# Patient Record
Sex: MALE | Race: BLACK OR AFRICAN AMERICAN | NOT HISPANIC OR LATINO | Employment: OTHER | URBAN - METROPOLITAN AREA
[De-identification: names, ages, dates, MRNs, and addresses within clinical notes are randomized per-mention and may not be internally consistent; named-entity substitution may affect disease eponyms.]

---

## 2017-01-06 ENCOUNTER — OFFICE VISIT (OUTPATIENT)
Dept: OPHTHALMOLOGY | Facility: CLINIC | Age: 77
End: 2017-01-06
Payer: MEDICARE

## 2017-01-06 DIAGNOSIS — H25.12 NUCLEAR SCLEROSIS, LEFT: ICD-10-CM

## 2017-01-06 DIAGNOSIS — H02.401 PTOSIS, RIGHT: Primary | ICD-10-CM

## 2017-01-06 DIAGNOSIS — Z98.890 POST-OPERATIVE STATE: ICD-10-CM

## 2017-01-06 DIAGNOSIS — Z96.1 PSEUDOPHAKIA: ICD-10-CM

## 2017-01-06 PROCEDURE — 99999 PR PBB SHADOW E&M-EST. PATIENT-LVL II: CPT | Mod: PBBFAC,,, | Performed by: OPHTHALMOLOGY

## 2017-01-06 PROCEDURE — 99212 OFFICE O/P EST SF 10 MIN: CPT | Mod: PBBFAC,PO | Performed by: OPHTHALMOLOGY

## 2017-01-06 PROCEDURE — 99024 POSTOP FOLLOW-UP VISIT: CPT | Mod: ,,, | Performed by: OPHTHALMOLOGY

## 2017-01-06 NOTE — PROGRESS NOTES
SUBJECTIVE:   Yovani Fonseca is a 76 y.o. male   Uncorrected distance visual acuity was 20/20 in the right eye and 20/80 in the left eye.   Chief Complaint   Patient presents with    Post-op Evaluation     OD lid drooping        HPI:  HPI     Post-op Evaluation    Additional comments: OD lid drooping           Comments   Pt states since his surgery on the right eye, he's noticed his lid has   been droopy and sunken in superiorly around the orbit. No pain or   irritation. Vision is great, finished his drops on the 4th.     REFERRED BY [facility]  Dm  PCIOL OD 12/1/16  PCIOL OD +23.0 SN60WF / CDE:15.84 12-1-16  No bdr  Dry eyes  H/o punctal plugs 2010 and replaced 11/6/15  Smd  Cats os       Last edited by Leno Morel on 1/6/2017  9:16 AM. (History)        Assessment /Plan :  1. Ptosis, right   Levator dehiscence OD, explained to pt this is commonly seen after cataract surgery and repairable with eyelid surgery after eye completely heals. I recommend he complete his CE OS first and then if OS ptotic, too they can be repaired at the same time. Pt is happy with this explanation and agrees with this approach if Dr Jeronimo agrees, too.   2. Pseudophakia    3. Post-operative state    4. Nuclear sclerosis, left

## 2017-01-06 NOTE — MR AVS SNAPSHOT
[facility] - Ophthalmology  [address] [facility] [place] [address]  Phone: [phone]  Fax: [phone]                  Yovani Fonseca   2017 9:00 AM   Office Visit    Description:  Male : 1940   Provider:  Dawit Duke MD   Department:  [facility] - Ophthalmology           Reason for Visit     Post-op Evaluation           Diagnoses this Visit        Comments    Ptosis, right    -  Primary     Pseudophakia         Post-operative state         Nuclear sclerosis, left                To Do List           Future Appointments        Provider Department Dept Phone    2017 1:00 PM Roberto Carlos Jeronimo MD [facility] Ophthalmology [phone]      Goals (5 Years of Data)     None      [facility] On Call     [facility] On Call Nurse [facility] Line -  Assistance  Registered nurses in the [facility] On Call Center provide clinical advisement, health education, appointment booking, and other advisory services.  Call for this free service at [phone].             Medications           Message regarding Medications     Verify the changes and/or additions to your medication regime listed below are the same as discussed with your clinician today.  If any of these changes or additions are incorrect, please notify your healthcare provider.             Verify that the below list of medications is an accurate representation of the medications you are currently taking.  If none reported, the list may be blank. If incorrect, please contact your healthcare provider. Carry this list with you in case of emergency.           Current Medications     GOSERELIN ACETATE (ZOLADEX SUBQ) Inject 1 Dose into the skin every 3 (three) months.    ramipril (ALTACE) 2.5 MG capsule     rosuvastatin (CRESTOR) 10 MG tablet     sitagliptan (JANUVIA) 25 MG Tab Take 100 mg by mouth once daily.    tamsulosin (FLOMAX) 0.4 mg Cp24     ZETIA 10 mg tablet            Clinical Reference Information           Allergies as of 2017     No Known  Allergies      Immunizations Administered on Date of Encounter - 1/6/2017     None

## 2017-01-27 ENCOUNTER — OFFICE VISIT (OUTPATIENT)
Dept: OPHTHALMOLOGY | Facility: CLINIC | Age: 77
End: 2017-01-27
Payer: MEDICARE

## 2017-01-27 DIAGNOSIS — Z98.890 POST-OPERATIVE STATE: Primary | ICD-10-CM

## 2017-01-27 PROCEDURE — 99999 PR PBB SHADOW E&M-EST. PATIENT-LVL II: CPT | Mod: PBBFAC,,, | Performed by: OPHTHALMOLOGY

## 2017-01-27 PROCEDURE — 99024 POSTOP FOLLOW-UP VISIT: CPT | Mod: ,,, | Performed by: OPHTHALMOLOGY

## 2017-01-27 PROCEDURE — 99212 OFFICE O/P EST SF 10 MIN: CPT | Mod: PBBFAC,PO | Performed by: OPHTHALMOLOGY

## 2017-01-27 RX ORDER — DIFLUPREDNATE OPHTHALMIC 0.5 MG/ML
1 EMULSION OPHTHALMIC 4 TIMES DAILY
COMMUNITY
End: 2017-02-21 | Stop reason: SDUPTHER

## 2017-01-27 RX ORDER — POLYMYXIN B SULFATE AND TRIMETHOPRIM 1; 10000 MG/ML; [USP'U]/ML
1 SOLUTION OPHTHALMIC EVERY 6 HOURS
COMMUNITY
End: 2017-03-03 | Stop reason: ALTCHOICE

## 2017-01-27 NOTE — MR AVS SNAPSHOT
[facility] - Ophthalmology  [address] [place] [address]  Phone: [phone]  Fax: [phone]                  Yovani Fonseca   2017 4:00 PM   Office Visit    Description:  Male : 1940   Provider:  Roberto Carlos Jeronimo MD   Department:  [facility] - Ophthalmology           Reason for Visit     Post-op Evaluation           Diagnoses this Visit        Comments    Post-operative state    -  Primary            To Do List           Future Appointments        Provider Department Dept Phone    2017 4:00 PM Roberto Carlos Jeronimo MD [facility] Ophthalmology [phone]      Goals (5 Years of Data)     None      Follow-Up and Disposition     Return in about 1 week (around 2/3/2017).      [facility] On Call     [facility] On Call Nurse Care Line -  Assistance  Registered nurses in the [facility] On Call Center provide clinical advisement, health education, appointment booking, and other advisory services.  Call for this free service at [phone].             Medications           Message regarding Medications     Verify the changes and/or additions to your medication regime listed below are the same as discussed with your clinician today.  If any of these changes or additions are incorrect, please notify your healthcare provider.             Verify that the below list of medications is an accurate representation of the medications you are currently taking.  If none reported, the list may be blank. If incorrect, please contact your healthcare provider. Carry this list with you in case of emergency.           Current Medications     difluprednate (DUREZOL) 0.05 % Drop ophthalmic solution 1 drop 4 (four) times daily.    GOSERELIN ACETATE (ZOLADEX SUBQ) Inject 1 Dose into the skin every 3 (three) months.    nepafenac (ILEVRO) 0.3 % DrpS Apply to eye.    polymyxin B sulf-trimethoprim (POLYTRIM) 10,000 unit- 1 mg/mL Drop 1 drop every 6 (six) hours.    ramipril (ALTACE) 2.5 MG capsule     rosuvastatin (CRESTOR)  10 MG tablet     sitagliptan (JANUVIA) 25 MG Tab Take 100 mg by mouth once daily.    tamsulosin (FLOMAX) 0.4 mg Cp24     ZETIA 10 mg tablet            Clinical Reference Information           Allergies as of 1/27/2017     No Known Allergies      Immunizations Administered on Date of Encounter - 1/27/2017     None

## 2017-01-27 NOTE — PROGRESS NOTES
HPI     Post-op Evaluation    Additional comments: OS: Durezol QID, Polytrim QID, Ilevro QD           Comments   Pt states that his left eye is a little sore today. States vision is a lot   better. 100% compliant and knowledgeable on gtts regiment.     REFERRED BY [facility]  Dm  PCIOL OS 1/26/17  PCIOL OD +23.0 SN60WF / CDE:15.84 12-1-16  No bdr  Dry eyes  H/o punctal plugs 2010 and replaced 11/6/15  Smd  Cats os      OS: Durezol QID, Polytrim QID, Ilevro QD       Last edited by Zeferino Lieberman, Patient Care Assistant on 1/27/2017  1:57   PM. (History)            Assessment /Plan     For exam results, see Encounter Report.      ICD-10-CM ICD-9-CM    1. Post-operative state Z98.890 V45.89 PO Day 1 S/P Phaco/IOL  left eye  Doing well.    Use Durezol QID  Ilevro daily  Polymyxin B 4 x day  Reinstructed in importance of absolute compliance with Post-OP instructions including medications, shield at bedtime, and limitation of activities. Follow up appointments in approximately one and six weeks or call immediately for increased pain, redness or vision loss.

## 2017-02-03 ENCOUNTER — OFFICE VISIT (OUTPATIENT)
Dept: OPHTHALMOLOGY | Facility: CLINIC | Age: 77
End: 2017-02-03
Payer: MEDICARE

## 2017-02-03 DIAGNOSIS — Z98.890 POST-OPERATIVE STATE: Primary | ICD-10-CM

## 2017-02-03 DIAGNOSIS — Z98.41 CATARACT EXTRACTION STATUS, RIGHT: ICD-10-CM

## 2017-02-03 DIAGNOSIS — Z98.42 CATARACT EXTRACTION STATUS, LEFT: ICD-10-CM

## 2017-02-03 PROCEDURE — 99212 OFFICE O/P EST SF 10 MIN: CPT | Mod: PBBFAC,PO | Performed by: OPHTHALMOLOGY

## 2017-02-03 PROCEDURE — 99024 POSTOP FOLLOW-UP VISIT: CPT | Mod: ,,, | Performed by: OPHTHALMOLOGY

## 2017-02-03 PROCEDURE — 99999 PR PBB SHADOW E&M-EST. PATIENT-LVL II: CPT | Mod: PBBFAC,,, | Performed by: OPHTHALMOLOGY

## 2017-02-03 NOTE — PROGRESS NOTES
HPI     Post-op Evaluation    Additional comments: OS: Durezol QID, Polytrim QID, Ilevro QD           Comments   Pt states that his VA is stable. No pain or discomfort. Both eyes have   been a bit runny lately. 100% compliant with gtts.     REFERRED BY [facility]  Dm  PCIOL OS +23.5 SN60WF /CDE 4.95 1/26/17  PCIOL OD +23.0 SN60WF / CDE:15.84 12-1-16  No bdr  Dry eyes  H/o punctal plugs 2010 and replaced 11/6/15  Smd  Cats os      OS: Durezol QID, Polytrim QID, Ilevro QD       Last edited by Zeferino Lieberman, Patient Care Assistant on 2/3/2017  2:00   PM. (History)            Assessment /Plan     For exam results, see Encounter Report.      ICD-10-CM ICD-9-CM    1. Post-operative state Z98.890 V45.89    2. Cataract extraction status, left Z98.42 V45.61    3. Cataract extraction status, right Z98.41 V45.61               PO Week 1 S/P Phaco/ IOL left eye, 5 week right eye:   Doing well with no evidence of infection or abnormal inflammation.     D/C antibiotic drops  Taper Durezol weekly per instruction sheet.  Ilevro daily 7 more days.  Resume normal activitites and d/c eye shield.  OTC reading glasses can be used until evaluated for final MR.  D/c Shield at night    RTC 5 weeks or PRN pain, redness, vision loss, or other concerns.

## 2017-02-03 NOTE — MR AVS SNAPSHOT
[facility]  [address] [place] [address]  Phone: [phone]  Fax: [phone]                  Yovani Fonseca   2/3/2017 1:30 PM   Office Visit    Description:  Male : 1940   Provider:  Roberto Carlos Jeronimo MD   Department:  [facility]           Reason for Visit     Post-op Evaluation           Diagnoses this Visit        Comments    Post-operative state    -  Primary     Cataract extraction status, left         Cataract extraction status, right                To Do List           Future Appointments        Provider Department Dept Phone    3/3/2017 2:00 PM Roberto Carlos Jeronimo MD [facility] Ophthalmology [phone]      Goals (5 Years of Data)     None      Follow-Up and Disposition     Return in about 5 weeks (around 3/10/2017).      [facility] On Call     [facility] On Call Nurse Care Line -  Assistance  Registered nurses in the [facility] On Call Center provide clinical advisement, health education, appointment booking, and other advisory services.  Call for this free service at [phone].             Medications           Message regarding Medications     Verify the changes and/or additions to your medication regime listed below are the same as discussed with your clinician today.  If any of these changes or additions are incorrect, please notify your healthcare provider.             Verify that the below list of medications is an accurate representation of the medications you are currently taking.  If none reported, the list may be blank. If incorrect, please contact your healthcare provider. Carry this list with you in case of emergency.           Current Medications     difluprednate (DUREZOL) 0.05 % Drop ophthalmic solution 1 drop 4 (four) times daily.    GOSERELIN ACETATE (ZOLADEX SUBQ) Inject 1 Dose into the skin every 3 (three) months.    nepafenac (ILEVRO) 0.3 % DrpS Apply to eye.    polymyxin B sulf-trimethoprim (POLYTRIM) 10,000 unit- 1 mg/mL Drop 1 drop  every 6 (six) hours.    ramipril (ALTACE) 2.5 MG capsule     rosuvastatin (CRESTOR) 10 MG tablet     sitagliptan (JANUVIA) 25 MG Tab Take 100 mg by mouth once daily.    tamsulosin (FLOMAX) 0.4 mg Cp24     ZETIA 10 mg tablet            Clinical Reference Information           Allergies as of 2/3/2017     No Known Allergies      Immunizations Administered on Date of Encounter - 2/3/2017     None      Language Assistance Services     ATTENTION: Language assistance services are available, free of charge. Please call [phone].      ATENCIÓN: Si berna howe, tiene a barrera disposición servicios gratuitos de asistencia lingüística. Llame al [phone].     CHÚ Ý: N?u b?n nói Ti?ng Vi?t, có các d?ch v? h? tr? ngôn ng? mi?n phí dành cho b?n. G?i s? [phone].         [facility] - Ophthal[facility] complies with applicable Federal civil rights laws and does not discriminate on the basis of race, color, national origin, age, disability, or sex.

## 2017-02-21 RX ORDER — DIFLUPREDNATE OPHTHALMIC 0.5 MG/ML
1 EMULSION OPHTHALMIC 4 TIMES DAILY
Qty: 5 ML | Refills: 1 | Status: SHIPPED | OUTPATIENT
Start: 2017-02-21 | End: 2018-09-14

## 2017-02-21 NOTE — TELEPHONE ENCOUNTER
----- Message from Rhonda Alejandre sent at 2/21/2017 11:10 AM CST -----  Contact: pt  Need refill on DUREZOL) pls call into pharmacy at  [phone]

## 2017-03-03 ENCOUNTER — OFFICE VISIT (OUTPATIENT)
Dept: OPHTHALMOLOGY | Facility: CLINIC | Age: 77
End: 2017-03-03
Payer: MEDICARE

## 2017-03-03 DIAGNOSIS — Z98.890 POST-OPERATIVE STATE: Primary | ICD-10-CM

## 2017-03-03 DIAGNOSIS — Z98.42 CATARACT EXTRACTION STATUS, LEFT: ICD-10-CM

## 2017-03-03 DIAGNOSIS — Z98.41 CATARACT EXTRACTION STATUS, RIGHT: ICD-10-CM

## 2017-03-03 PROCEDURE — 99999 PR PBB SHADOW E&M-EST. PATIENT-LVL II: CPT | Mod: PBBFAC,,, | Performed by: OPHTHALMOLOGY

## 2017-03-03 PROCEDURE — 99212 OFFICE O/P EST SF 10 MIN: CPT | Mod: PBBFAC,PO | Performed by: OPHTHALMOLOGY

## 2017-03-03 PROCEDURE — 99024 POSTOP FOLLOW-UP VISIT: CPT | Mod: ,,, | Performed by: OPHTHALMOLOGY

## 2017-03-03 NOTE — PROGRESS NOTES
HPI     Post-op Evaluation    Additional comments: OS: Durezol, Ilvero QD           Comments   5 wk PO PCIOL OS  VA improving  No pain or discomfort    REFERRED BY [facility]  Dm  PCIOL OS +23.5 SN60WF /CDE 4.95 1/26/17  PCIOL OD +23.0 SN60WF / CDE:15.84 12-1-16  No bdr  Dry eyes  H/o punctal plugs 2010 and replaced 11/6/15  Smd    OS: Durezol, Ilevro QD       Last edited by Alona Moreno on 3/3/2017  2:22 PM. (History)            Assessment /Plan     For exam results, see Encounter Report.      ICD-10-CM ICD-9-CM    1. Post-operative state Z98.890 V45.89    2. Cataract extraction status, right Z98.41 V45.61    3. Cataract extraction status, left Z98.42 V45.61        PO Month 1 S/P Phaco/IOL both eye:   Doing Well.    Discontinue medications as per taper sheet.  Dispense PAL Rx  RTC 6 months with Dr. Jung

## 2017-09-12 ENCOUNTER — OFFICE VISIT (OUTPATIENT)
Dept: OPHTHALMOLOGY | Facility: CLINIC | Age: 77
End: 2017-09-12
Payer: MEDICARE

## 2017-09-12 DIAGNOSIS — E11.9 TYPE 2 DIABETES MELLITUS WITHOUT COMPLICATION, WITHOUT LONG-TERM CURRENT USE OF INSULIN: Primary | ICD-10-CM

## 2017-09-12 DIAGNOSIS — H35.3131 EARLY STAGE DRY AGE-RELATED MACULAR DEGENERATION OF BOTH EYES: ICD-10-CM

## 2017-09-12 PROCEDURE — 99212 OFFICE O/P EST SF 10 MIN: CPT | Mod: PBBFAC,PO | Performed by: OPHTHALMOLOGY

## 2017-09-12 PROCEDURE — 92014 COMPRE OPH EXAM EST PT 1/>: CPT | Mod: S$PBB,,, | Performed by: OPHTHALMOLOGY

## 2017-09-12 PROCEDURE — 99999 PR PBB SHADOW E&M-EST. PATIENT-LVL II: CPT | Mod: PBBFAC,,, | Performed by: OPHTHALMOLOGY

## 2017-09-12 NOTE — PROGRESS NOTES
===============================  09/12/2017   Yovani Fonseca,   77 y.o. male   Last visit [facility]: :11/8/2016   Last visit eye dept. Visit date not found  VA:  Uncorrected distance visual acuity was 20/20 in the right eye and 20/25 in the left eye.  Tonometry     Tonometry (Applanation, 1:49 PM)       Right Left    Pressure 14 14               Not recorded         Not recorded        Chief Complaint   Patient presents with    Diabetic Eye Exam     yearly diabetic/amd exam    Macular Degeneration     Ophthalmic Medications     Ophthalmic - Anti-inflammatory, Glucocorticoids Start End    difluprednate (DUREZOL) 0.05 % Drop ophthalmic solution 2/21/2017     Sig: Place 1 drop into the left eye 4 (four) times daily.    Route: Left Eye         HPI     Diabetic Eye Exam    Additional comments: yearly diabetic/amd exam           Comments   REFERRED BY [facility]  Dm  PCIOL OS +23.5 SN60WF /CDE 4.95 1/26/17  PCIOL OD +23.0 SN60WF / CDE:15.84 12-1-16  No bdr  Dry eyes  H/o punctal plugs 2010 and replaced 11/6/15  Smd               Last edited by Mayra Dash on 9/12/2017  1:46 PM. 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       .  Doing great !   codi fung   sl brow traction  rtc 1 year         ===========================

## 2018-09-14 ENCOUNTER — OFFICE VISIT (OUTPATIENT)
Dept: OPHTHALMOLOGY | Facility: CLINIC | Age: 78
End: 2018-09-14
Payer: MEDICARE

## 2018-09-14 DIAGNOSIS — H35.3131 EARLY DRY STAGE NONEXUDATIVE AGE-RELATED MACULAR DEGENERATION OF BOTH EYES: ICD-10-CM

## 2018-09-14 DIAGNOSIS — E11.9 TYPE 2 DIABETES MELLITUS WITHOUT COMPLICATION, WITHOUT LONG-TERM CURRENT USE OF INSULIN: Primary | ICD-10-CM

## 2018-09-14 PROCEDURE — 92134 CPTRZ OPH DX IMG PST SGM RTA: CPT | Mod: PBBFAC,PO | Performed by: OPHTHALMOLOGY

## 2018-09-14 PROCEDURE — 99999 PR PBB SHADOW E&M-EST. PATIENT-LVL II: CPT | Mod: PBBFAC,,, | Performed by: OPHTHALMOLOGY

## 2018-09-14 PROCEDURE — 92014 COMPRE OPH EXAM EST PT 1/>: CPT | Mod: S$PBB,,, | Performed by: OPHTHALMOLOGY

## 2018-09-14 PROCEDURE — 99212 OFFICE O/P EST SF 10 MIN: CPT | Mod: PBBFAC,PO | Performed by: OPHTHALMOLOGY

## 2018-09-14 RX ORDER — FINASTERIDE 5 MG/1
5 TABLET, FILM COATED ORAL
COMMUNITY
Start: 2018-08-21

## 2018-09-14 RX ORDER — ROSUVASTATIN CALCIUM 20 MG/1
TABLET, COATED ORAL
COMMUNITY
Start: 2018-09-12

## 2018-09-14 RX ORDER — FESOTERODINE FUMARATE 8 MG/1
TABLET, FILM COATED, EXTENDED RELEASE ORAL
COMMUNITY
Start: 2018-09-06 | End: 2019-09-16

## 2018-09-14 NOTE — PROGRESS NOTES
"    ===============================  09/14/2018   Yovani Fonseca,   78 y.o. male   Last visit [facility]: :9/12/2017   Last visit eye dept. Visit date not found  VA:  Uncorrected distance visual acuity was 20/20 in the right eye and 20/25 in the left eye.  Tonometry     Tonometry (Applanation, 10:00 AM)       Right Left    Pressure 12 11               Not recorded         Not recorded        Chief Complaint   Patient presents with    Diabetes     yearly exam    Macular Degeneration     Ophthalmic Medications     Ophthalmic - Anti-inflammatory, Glucocorticoids Start End    difluprednate (DUREZOL) 0.05 % Drop ophthalmic solution (Discontinued) 2/21/2017 9/14/2018    Sig: Place 1 drop into the left eye 4 (four) times daily.    Route: Left Eye         HPI     Diabetes      Additional comments: yearly exam              Comments     REFERRED BY [facility]  Dm  PCIOL OS +23.5 SN60WF /CDE 4.95 1/26/17  PCIOL OD +23.0 SN60WF / CDE:15.84 12-1-16  No bdr  Dry eyes  H/o punctal plugs 2010 and replaced 11/6/15  Smd                  Last edited by Mayra Dash on 9/14/2018  9:58 AM. 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

## 2019-05-31 ENCOUNTER — OFFICE VISIT (OUTPATIENT)
Dept: OPHTHALMOLOGY | Facility: CLINIC | Age: 79
End: 2019-05-31
Payer: MEDICARE

## 2019-05-31 DIAGNOSIS — H35.3131 EARLY DRY STAGE NONEXUDATIVE AGE-RELATED MACULAR DEGENERATION OF BOTH EYES: ICD-10-CM

## 2019-05-31 DIAGNOSIS — E11.9 TYPE 2 DIABETES MELLITUS WITHOUT COMPLICATION, WITHOUT LONG-TERM CURRENT USE OF INSULIN: Primary | ICD-10-CM

## 2019-05-31 PROCEDURE — 99999 PR PBB SHADOW E&M-EST. PATIENT-LVL II: CPT | Mod: PBBFAC,,, | Performed by: OPHTHALMOLOGY

## 2019-05-31 PROCEDURE — 99999 PR PBB SHADOW E&M-EST. PATIENT-LVL II: ICD-10-PCS | Mod: PBBFAC,,, | Performed by: OPHTHALMOLOGY

## 2019-05-31 PROCEDURE — 92134 POSTERIOR SEGMENT OCT RETINA (OCULAR COHERENCE TOMOGRAPHY)-BOTH EYES: ICD-10-PCS | Mod: 26,S$PBB,, | Performed by: OPHTHALMOLOGY

## 2019-05-31 PROCEDURE — 92134 CPTRZ OPH DX IMG PST SGM RTA: CPT | Mod: PBBFAC,PN | Performed by: OPHTHALMOLOGY

## 2019-05-31 PROCEDURE — 99212 OFFICE O/P EST SF 10 MIN: CPT | Mod: PBBFAC,PN,25 | Performed by: OPHTHALMOLOGY

## 2019-05-31 PROCEDURE — 92012 INTRM OPH EXAM EST PATIENT: CPT | Mod: S$PBB,,, | Performed by: OPHTHALMOLOGY

## 2019-05-31 PROCEDURE — 92012 PR EYE EXAM, EST PATIENT,INTERMED: ICD-10-PCS | Mod: S$PBB,,, | Performed by: OPHTHALMOLOGY

## 2019-05-31 RX ORDER — OXYBUTYNIN CHLORIDE 5 MG/1
5 TABLET, EXTENDED RELEASE ORAL
COMMUNITY
Start: 2019-01-30 | End: 2019-09-16

## 2019-05-31 NOTE — PROGRESS NOTES
l    ===============================  05/31/2019   Yovani Fonseca,   79 y.o. male   Last visit [facility]: :9/14/2018   Last visit eye dept. Visit date not found  VA:  Uncorrected distance visual acuity was 20/20 in the right eye and 20/20 -2 in the left eye.  Tonometry     Tonometry (Applanation, 11:09 AM)       Right Left    Pressure 12 12               Not recorded         Not recorded        Chief Complaint   Patient presents with    Blurred Vision     Blurry vision since starting new medication. pt states he was on a medication a few weeks back that made his vision blurry, was told by his doctor that may happen. He's been off the med for about 3 weeks, it was only temporary. since then his vision is better, was worse when reading. not using any drops, no ocular pain or irritation.         HPI     Blurred Vision      Additional comments: Blurry vision since starting new medication. pt   states he was on a medication a few weeks back that made his vision   blurry, was told by his doctor that may happen. He's been off the med for   about 3 weeks, it was only temporary. since then his vision is better, was   worse when reading. not using any drops, no ocular pain or irritation.               Comments     REFERRED BY [facility]  Dm  PCIOL OS +23.5 SN60WF /CDE 4.95 1/26/17  PCIOL OD +23.0 SN60WF / CDE:15.84 12-1-16  No bdr  Dry eyes  H/o punctal plugs 2010 and replaced 11/6/15  Smd          Last edited by Leno Morel on 5/31/2019 10:22 AM. 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       ===========================

## 2019-09-16 ENCOUNTER — OFFICE VISIT (OUTPATIENT)
Dept: OPHTHALMOLOGY | Facility: CLINIC | Age: 79
End: 2019-09-16
Payer: MEDICARE

## 2019-09-16 DIAGNOSIS — H35.3131 EARLY STAGE DRY AGE-RELATED MACULAR DEGENERATION OF BOTH EYES: ICD-10-CM

## 2019-09-16 DIAGNOSIS — E11.9 TYPE 2 DIABETES MELLITUS WITHOUT COMPLICATION, WITHOUT LONG-TERM CURRENT USE OF INSULIN: Primary | ICD-10-CM

## 2019-09-16 PROCEDURE — 92014 PR EYE EXAM, EST PATIENT,COMPREHESV: ICD-10-PCS | Mod: S$PBB,,, | Performed by: OPHTHALMOLOGY

## 2019-09-16 PROCEDURE — 92134 POSTERIOR SEGMENT OCT RETINA (OCULAR COHERENCE TOMOGRAPHY)-BOTH EYES: ICD-10-PCS | Mod: 26,S$PBB,, | Performed by: OPHTHALMOLOGY

## 2019-09-16 PROCEDURE — 92014 COMPRE OPH EXAM EST PT 1/>: CPT | Mod: S$PBB,,, | Performed by: OPHTHALMOLOGY

## 2019-09-16 PROCEDURE — 99212 OFFICE O/P EST SF 10 MIN: CPT | Mod: PBBFAC | Performed by: OPHTHALMOLOGY

## 2019-09-16 PROCEDURE — 92134 CPTRZ OPH DX IMG PST SGM RTA: CPT | Mod: PBBFAC | Performed by: OPHTHALMOLOGY

## 2019-09-16 PROCEDURE — 99999 PR PBB SHADOW E&M-EST. PATIENT-LVL II: CPT | Mod: PBBFAC,,, | Performed by: OPHTHALMOLOGY

## 2019-09-16 PROCEDURE — 99999 PR PBB SHADOW E&M-EST. PATIENT-LVL II: ICD-10-PCS | Mod: PBBFAC,,, | Performed by: OPHTHALMOLOGY

## 2019-09-16 NOTE — PROGRESS NOTES
===============================  Yovani Fonseca,   79 y.o. male   Last visit [facility]: :5/31/2019   Last visit eye dept. 5/31/2019  VA:  Uncorrected distance visual acuity was 20/20 in the right eye and 20/25 in the left eye.  Tonometry     Tonometry (Applanation, 10:09 AM)       Right Left    Pressure 12 12               Not recorded         Not recorded         Not recorded        Chief Complaint   Patient presents with    Diabetes     yearly diabetic exam          ________________  9/16/2019  HPI     Diabetes      Additional comments: yearly diabetic exam              Comments     REFERRED BY [facility]  Dm  PCIOL OS +23.5 SN60WF /CDE 4.95 1/26/17  PCIOL OD +23.0 SN60WF / CDE:15.84 12-1-16  No bdr  Dry eyes  H/o punctal plugs 2010 and replaced 11/6/15  Smd          Last edited by Mayra Dash on 9/16/2019 10:01 AM. 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

## 2020-09-18 ENCOUNTER — OFFICE VISIT (OUTPATIENT)
Dept: OPHTHALMOLOGY | Facility: CLINIC | Age: 80
End: 2020-09-18
Payer: MEDICARE

## 2020-09-18 DIAGNOSIS — H04.123 BILATERAL DRY EYES: ICD-10-CM

## 2020-09-18 DIAGNOSIS — E11.9 CONTROLLED TYPE 2 DIABETES MELLITUS WITHOUT COMPLICATION, WITHOUT LONG-TERM CURRENT USE OF INSULIN: Primary | ICD-10-CM

## 2020-09-18 DIAGNOSIS — H35.3131 EARLY STAGE DRY AGE-RELATED MACULAR DEGENERATION OF BOTH EYES: ICD-10-CM

## 2020-09-18 PROCEDURE — 92014 COMPRE OPH EXAM EST PT 1/>: CPT | Mod: S$PBB,,, | Performed by: OPHTHALMOLOGY

## 2020-09-18 PROCEDURE — 99211 OFF/OP EST MAY X REQ PHY/QHP: CPT | Mod: PBBFAC | Performed by: OPHTHALMOLOGY

## 2020-09-18 PROCEDURE — 99999 PR PBB SHADOW E&M-EST. PATIENT-LVL I: ICD-10-PCS | Mod: PBBFAC,,, | Performed by: OPHTHALMOLOGY

## 2020-09-18 PROCEDURE — 92014 PR EYE EXAM, EST PATIENT,COMPREHESV: ICD-10-PCS | Mod: S$PBB,,, | Performed by: OPHTHALMOLOGY

## 2020-09-18 PROCEDURE — 99999 PR PBB SHADOW E&M-EST. PATIENT-LVL I: CPT | Mod: PBBFAC,,, | Performed by: OPHTHALMOLOGY

## 2020-09-18 RX ORDER — OMEPRAZOLE 20 MG/1
CAPSULE, DELAYED RELEASE ORAL
COMMUNITY
Start: 2020-08-25

## 2020-09-18 NOTE — PROGRESS NOTES
===============================  Yovani Fonseca,  9/18/2020 today   80 y.o. male   Last visit [facility]: :9/16/2019   Last visit eye dept. Visit date not found  VA:  Uncorrected distance visual acuity was 20/20 in the right eye and 20/20 -2 in the left eye.  Tonometry     Tonometry (Applanation, 10:08 AM)       Right Left    Pressure 14 13               Not recorded         Not recorded         Not recorded        Chief Complaint   Patient presents with    Diabetic Eye Exam       ________________  9/18/2020 today  HPI     Dm  PCIOL OS +23.5 SN60WF /CDE 4.95 1/26/17  PCIOL OD +23.0 SN60WF / CDE:15.84 12-1-16  No bdr  Dry eyes  H/o punctal plugs 2010 and replaced 11/6/15  Vera      Last edited by RADHA Jung MD on 9/18/2020 10:02 AM. 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

## 2022-04-14 RX ORDER — NEOMYCIN SULFATE, POLYMYXIN B SULFATE AND DEXAMETHASONE 3.5; 10000; 1 MG/ML; [USP'U]/ML; MG/ML
1 SUSPENSION/ DROPS OPHTHALMIC 3 TIMES DAILY
Qty: 5 ML | Refills: 0 | Status: SHIPPED | OUTPATIENT
Start: 2022-04-14 | End: 2022-04-24

## 2022-05-10 ENCOUNTER — OFFICE VISIT (OUTPATIENT)
Dept: OPHTHALMOLOGY | Facility: CLINIC | Age: 82
End: 2022-05-10
Payer: MEDICARE

## 2022-05-10 DIAGNOSIS — H35.3131 EARLY STAGE DRY AGE-RELATED MACULAR DEGENERATION OF BOTH EYES: ICD-10-CM

## 2022-05-10 DIAGNOSIS — Z96.1 PSEUDOPHAKIA: ICD-10-CM

## 2022-05-10 DIAGNOSIS — E11.9 CONTROLLED TYPE 2 DIABETES MELLITUS WITHOUT COMPLICATION, WITHOUT LONG-TERM CURRENT USE OF INSULIN: Primary | ICD-10-CM

## 2022-05-10 DIAGNOSIS — H04.123 BILATERAL DRY EYES: ICD-10-CM

## 2022-05-10 PROCEDURE — 99213 PR OFFICE/OUTPT VISIT, EST, LEVL III, 20-29 MIN: ICD-10-PCS | Mod: S$PBB,,, | Performed by: OPHTHALMOLOGY

## 2022-05-10 PROCEDURE — 92134 POSTERIOR SEGMENT OCT RETINA (OCULAR COHERENCE TOMOGRAPHY)-BOTH EYES: ICD-10-PCS | Mod: 26,S$PBB,, | Performed by: OPHTHALMOLOGY

## 2022-05-10 PROCEDURE — 99213 OFFICE O/P EST LOW 20 MIN: CPT | Mod: S$PBB,,, | Performed by: OPHTHALMOLOGY

## 2022-05-10 PROCEDURE — 99999 PR PBB SHADOW E&M-EST. PATIENT-LVL II: CPT | Mod: PBBFAC,,, | Performed by: OPHTHALMOLOGY

## 2022-05-10 PROCEDURE — 99999 PR PBB SHADOW E&M-EST. PATIENT-LVL II: ICD-10-PCS | Mod: PBBFAC,,, | Performed by: OPHTHALMOLOGY

## 2022-05-10 PROCEDURE — 92134 CPTRZ OPH DX IMG PST SGM RTA: CPT | Mod: PBBFAC | Performed by: OPHTHALMOLOGY

## 2022-05-10 PROCEDURE — 99212 OFFICE O/P EST SF 10 MIN: CPT | Mod: PBBFAC | Performed by: OPHTHALMOLOGY

## 2022-05-10 RX ORDER — AMITRIPTYLINE HYDROCHLORIDE 10 MG/1
10 TABLET, FILM COATED ORAL NIGHTLY
COMMUNITY
Start: 2022-04-26

## 2022-05-10 RX ORDER — CHLORPHENIRAMINE MALEATE AND DEXTROMETHORPHAN HYDROBROMIDE 4; 30 MG/1; MG/1
TABLET, FILM COATED ORAL
COMMUNITY
Start: 2021-11-12

## 2022-05-10 RX ORDER — DICYCLOMINE HYDROCHLORIDE 20 MG/1
TABLET ORAL
COMMUNITY
Start: 2022-03-07

## 2022-05-11 NOTE — PROGRESS NOTES
===============================  Date today is 5/10/2022  Yovani Fonseca is a 82 y.o. male  Last visit [facility]: :9/18/2020   Last visit eye dept. Visit date not found    Uncorrected distance visual acuity was 20/20 in the right eye and 20/25 in the left eye.  Tonometry     Tonometry (Applanation, 3:23 PM)       Right Left    Pressure 18 18              Not recorded       Not recorded       Not recorded       Chief Complaint   Patient presents with    Diabetes     Yearly diabetic exam     HPI     Diabetes      Additional comments: Yearly diabetic exam              Comments     Dm  PCIOL OS +23.5 SN60WF /CDE 4.95 1/26/17  PCIOL OD +23.0 SN60WF / CDE:15.84 12-1-16  No bdr  Dry eyes  H/o punctal plugs 2010 and replaced 11/6/15  Smd            Last edited by Mayra Dash on 5/10/2022  3:05 PM. 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 Check OU daily.   Gave my office and cell phone number.      .      ===============================       removed

## 2023-02-14 ENCOUNTER — OFFICE VISIT (OUTPATIENT)
Dept: OPHTHALMOLOGY | Facility: CLINIC | Age: 83
End: 2023-02-14
Payer: MEDICARE

## 2023-02-14 DIAGNOSIS — H04.123 BILATERAL DRY EYES: ICD-10-CM

## 2023-02-14 DIAGNOSIS — Z96.1 PSEUDOPHAKIA: ICD-10-CM

## 2023-02-14 DIAGNOSIS — H35.3131 EARLY STAGE DRY AGE-RELATED MACULAR DEGENERATION OF BOTH EYES: ICD-10-CM

## 2023-02-14 DIAGNOSIS — E11.9 CONTROLLED TYPE 2 DIABETES MELLITUS WITHOUT COMPLICATION, WITHOUT LONG-TERM CURRENT USE OF INSULIN: Primary | ICD-10-CM

## 2023-02-14 PROCEDURE — 99999 PR PBB SHADOW E&M-EST. PATIENT-LVL II: ICD-10-PCS | Mod: PBBFAC,,, | Performed by: OPHTHALMOLOGY

## 2023-02-14 PROCEDURE — 92134 CPTRZ OPH DX IMG PST SGM RTA: CPT | Mod: PBBFAC | Performed by: OPHTHALMOLOGY

## 2023-02-14 PROCEDURE — 99999 PR PBB SHADOW E&M-EST. PATIENT-LVL II: CPT | Mod: PBBFAC,,, | Performed by: OPHTHALMOLOGY

## 2023-02-14 PROCEDURE — 99214 PR OFFICE/OUTPT VISIT, EST, LEVL IV, 30-39 MIN: ICD-10-PCS | Mod: S$PBB,,, | Performed by: OPHTHALMOLOGY

## 2023-02-14 PROCEDURE — 99212 OFFICE O/P EST SF 10 MIN: CPT | Mod: PBBFAC | Performed by: OPHTHALMOLOGY

## 2023-02-14 PROCEDURE — 99214 OFFICE O/P EST MOD 30 MIN: CPT | Mod: S$PBB,,, | Performed by: OPHTHALMOLOGY

## 2023-02-14 PROCEDURE — 92134 POSTERIOR SEGMENT OCT RETINA (OCULAR COHERENCE TOMOGRAPHY)-BOTH EYES: ICD-10-PCS | Mod: 26,S$PBB,, | Performed by: OPHTHALMOLOGY

## 2023-02-14 RX ORDER — NEOMYCIN SULFATE, POLYMYXIN B SULFATE AND DEXAMETHASONE 3.5; 10000; 1 MG/ML; [USP'U]/ML; MG/ML
1 SUSPENSION/ DROPS OPHTHALMIC 3 TIMES DAILY
Qty: 5 ML | Refills: 3 | Status: SHIPPED | OUTPATIENT
Start: 2023-02-14 | End: 2023-02-24

## 2023-02-14 NOTE — PROGRESS NOTES
===============================  Date today is 2/14/2023  Yovani Fonseca is a 83 y.o. male  Last visit [facility]: :5/10/2022   Last visit eye dept. Visit date not found    Uncorrected distance visual acuity was 20/20 in the right eye and 20/25 in the left eye.  Tonometry       Tonometry (Applanation, 7:46 AM)         Right Left    Pressure 15 17                  Not recorded       Not recorded       Not recorded       Chief Complaint   Patient presents with    Diabetic Eye Exam     Pt states here for DM eye exam / no va c/o but does c/o ou watering     HPI     Diabetic Eye Exam     Additional comments: Pt states here for DM eye exam / no va c/o but does   c/o ou watering           Comments    Dm  PCIOL OS +23.5 SN60WF /CDE 4.95 1/26/17  PCIOL OD +23.0 SN60WF / CDE:15.84 12-1-16  No bdr  Dry eyes  H/o punctal plugs 2010 and replaced 11/6/15  Smd          Last edited by APURVA Damon on 2/14/2023  7:46 AM.      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  Check OU independently daily.    Gave my office and personal cell phone number.  ________________  2/14/2023 today  Yovani Fonseca  :  DM no retinopathy  OCT looks good  PCIOL OU with PCM but pt asymptomatic  C/o OU tearing  No SPK-irritant conjunctivitis  Maxitrol TID OU for 10 days  Dry SMD- no bleeding  Macula looks good- only a couple drusen    RTC 1 year  Instructed to call 24/7 for any worsening of vision or symptoms. Check OU daily.    Gave my office and cell phone number.    =============================

## 2023-11-21 ENCOUNTER — TELEPHONE (OUTPATIENT)
Dept: OPHTHALMOLOGY | Facility: CLINIC | Age: 83
End: 2023-11-21
Payer: MEDICARE

## 2023-11-21 NOTE — TELEPHONE ENCOUNTER
----- Message from Elly Segovia sent at 11/21/2023  1:46 PM CST -----  Regarding: Dr Jung  Contact: Yovani  Type:  Sooner Apoointment Request    Caller is requesting a sooner appointment.  Caller declined first available appointment listed below.  Caller will not accept being placed on the waitlist and is requesting a message be sent to doctor.  Name of Caller: Yovani  When is the first available appointment?  Symptoms:  Would the patient rather a call back or a response via [facility]? call  Best Call Back Number: [phone] (home)    Additional Information:  annual eye exam

## 2023-12-19 ENCOUNTER — OFFICE VISIT (OUTPATIENT)
Dept: OPHTHALMOLOGY | Facility: CLINIC | Age: 83
End: 2023-12-19
Payer: MEDICARE

## 2023-12-19 DIAGNOSIS — E11.9 CONTROLLED TYPE 2 DIABETES MELLITUS WITHOUT COMPLICATION, WITHOUT LONG-TERM CURRENT USE OF INSULIN: ICD-10-CM

## 2023-12-19 DIAGNOSIS — Z96.1 PSEUDOPHAKIA: Primary | ICD-10-CM

## 2023-12-19 PROCEDURE — 99213 OFFICE O/P EST LOW 20 MIN: CPT | Mod: PBBFAC | Performed by: OPHTHALMOLOGY

## 2023-12-19 PROCEDURE — 99213 PR OFFICE/OUTPT VISIT, EST, LEVL III, 20-29 MIN: ICD-10-PCS | Mod: S$PBB,,, | Performed by: OPHTHALMOLOGY

## 2023-12-19 PROCEDURE — 99999 PR PBB SHADOW E&M-EST. PATIENT-LVL III: CPT | Mod: PBBFAC,,, | Performed by: OPHTHALMOLOGY

## 2023-12-19 PROCEDURE — 99999 PR PBB SHADOW E&M-EST. PATIENT-LVL III: ICD-10-PCS | Mod: PBBFAC,,, | Performed by: OPHTHALMOLOGY

## 2023-12-19 PROCEDURE — 99213 OFFICE O/P EST LOW 20 MIN: CPT | Mod: S$PBB,,, | Performed by: OPHTHALMOLOGY

## 2023-12-19 RX ORDER — NEOMYCIN SULFATE, POLYMYXIN B SULFATE AND DEXAMETHASONE 3.5; 10000; 1 MG/ML; [USP'U]/ML; MG/ML
1 SUSPENSION/ DROPS OPHTHALMIC 3 TIMES DAILY
Qty: 5 ML | Refills: 0 | Status: SHIPPED | OUTPATIENT
Start: 2023-12-19 | End: 2024-01-02

## 2023-12-19 NOTE — PROGRESS NOTES
===============================  Date today is 12/19/2023  Yovani Fonseca is a 83 y.o. male  Last visit [facility]: :2/14/2023   Last visit eye dept. Visit date not found    Uncorrected distance visual acuity was 20/20 -2 in the right eye and 20/25 in the left eye.  Tonometry       Tonometry (Applanation, 1:08 PM)         Right Left    Pressure 17 17                  Not recorded       Not recorded       Not recorded       Chief Complaint   Patient presents with    Eye Problem     Pt c/o itchy, burning eyes. Pt states symptoms started over a month ago. He's been using Visine 2x a week. Says it feels like his eyes are dry.     HPI     Eye Problem     Additional comments: Pt c/o itchy, burning eyes. Pt states symptoms   started over a month ago. He's been using Visine 2x a week. Says it feels   like his eyes are dry.           Comments    Dm  PCIOL OS +23.5 SN60WF /CDE 4.95 1/26/17  PCIOL OD +23.0 SN60WF / CDE:15.84 12-1-16  No bdr  Dry eyes  H/o punctal plugs 2010 and replaced 11/6/15  Smd             Last edited by Leno Morel MA on 12/19/2023  7:56 AM.      Problem List Items Addressed This Visit    None  Visit Diagnoses       Pseudophakia    -  Primary    Controlled type 2 diabetes mellitus without complication, without long-term current use of insulin              Instructed to call 24/7 for any worsening of vision, visual distortion or pain.  Check OU independently daily.    Gave my office and personal cell phone number.  ________________  12/19/2023 today  Yovani Fonseca    Recurrent keratoconjucntivitis  Recommend Maxitrol TID OU for 10 days  d/c Visine, add Artificial tears  DM no retinopathy  C/d 0.3 OU  No SPK  IOP ok 17 OU    RTC 1 year  Instructed to call 24/7 for any worsening of vision or symptoms. Check OU daily.   Gave my office and cell phone number.    =============================